# Patient Record
Sex: FEMALE | Race: WHITE | NOT HISPANIC OR LATINO
[De-identification: names, ages, dates, MRNs, and addresses within clinical notes are randomized per-mention and may not be internally consistent; named-entity substitution may affect disease eponyms.]

---

## 2019-01-11 ENCOUNTER — RESULT REVIEW (OUTPATIENT)
Age: 39
End: 2019-01-11

## 2021-08-03 PROBLEM — Z00.00 ENCOUNTER FOR PREVENTIVE HEALTH EXAMINATION: Status: ACTIVE | Noted: 2021-08-03

## 2021-09-14 ENCOUNTER — APPOINTMENT (OUTPATIENT)
Dept: PEDIATRIC ALLERGY IMMUNOLOGY | Facility: CLINIC | Age: 41
End: 2021-09-14
Payer: COMMERCIAL

## 2021-09-14 ENCOUNTER — LABORATORY RESULT (OUTPATIENT)
Age: 41
End: 2021-09-14

## 2021-09-14 VITALS
HEIGHT: 68 IN | BODY MASS INDEX: 25.91 KG/M2 | DIASTOLIC BLOOD PRESSURE: 80 MMHG | OXYGEN SATURATION: 98 % | WEIGHT: 171 LBS | HEART RATE: 76 BPM | SYSTOLIC BLOOD PRESSURE: 122 MMHG | TEMPERATURE: 96.2 F

## 2021-09-14 DIAGNOSIS — Z83.6 FAMILY HISTORY OF OTHER DISEASES OF THE RESPIRATORY SYSTEM: ICD-10-CM

## 2021-09-14 PROCEDURE — 36415 COLL VENOUS BLD VENIPUNCTURE: CPT

## 2021-09-14 PROCEDURE — 99203 OFFICE O/P NEW LOW 30 MIN: CPT | Mod: 25

## 2021-09-14 RX ORDER — AZELASTINE HYDROCHLORIDE 137 UG/1
137 SPRAY, METERED NASAL TWICE DAILY
Qty: 1 | Refills: 5 | Status: ACTIVE | COMMUNITY
Start: 2021-09-14 | End: 1900-01-01

## 2021-09-14 RX ORDER — FLUTICASONE PROPIONATE 50 UG/1
50 SPRAY, METERED NASAL DAILY
Qty: 1 | Refills: 5 | Status: ACTIVE | COMMUNITY
Start: 2021-09-14 | End: 1900-01-01

## 2021-09-14 RX ORDER — KETOTIFEN FUMARATE 0.25 MG/ML
0.03 SOLUTION OPHTHALMIC
Qty: 1 | Refills: 5 | Status: ACTIVE | COMMUNITY
Start: 2021-09-14 | End: 1900-01-01

## 2021-10-01 LAB
A ALTERNATA IGE QN: 9.45 KUA/L
A FUMIGATUS IGE QN: 0.77 KUA/L
AMER BEECH IGE QN: 1
BERMUDA GRASS IGE QN: 0.16 KUA/L
BOXELDER IGE QN: 1.99 KUA/L
C HERBARUM IGE QN: 0.84 KUA/L
CALIF WALNUT IGE QN: 2.09 KUA/L
CAT DANDER IGE QN: 14.8 KUA/L
CMN PIGWEED IGE QN: 0.58 KUA/L
COCKLEBUR IGE QN: 0.45 KUA/L
COCKSFOOT IGE QN: 2.34 KUA/L
COMMON RAGWEED IGE QN: 1.99 KUA/L
COTTONWOOD IGE QN: 0.79 KUA/L
D FARINAE IGE QN: 6.54 KUA/L
D PTERONYSS IGE QN: 6.17 KUA/L
DEPRECATED A ALTERNATA IGE RAST QL: 3
DEPRECATED A FUMIGATUS IGE RAST QL: 2
DEPRECATED AMER BEECH IGE RAST QL: 0.66 KUA/L
DEPRECATED BERMUDA GRASS IGE RAST QL: NORMAL
DEPRECATED BOXELDER IGE RAST QL: 2
DEPRECATED C HERBARUM IGE RAST QL: 2
DEPRECATED CAT DANDER IGE RAST QL: 3
DEPRECATED COCKLEBUR IGE RAST QL: 1
DEPRECATED COCKSFOOT IGE RAST QL: 2
DEPRECATED COMMON PIGWEED IGE RAST QL: 1
DEPRECATED COMMON RAGWEED IGE RAST QL: 2
DEPRECATED COTTONWOOD IGE RAST QL: 2
DEPRECATED D FARINAE IGE RAST QL: 3
DEPRECATED D PTERONYSS IGE RAST QL: 3
DEPRECATED DOG DANDER IGE RAST QL: 3
DEPRECATED ENGL PLANTAIN IGE RAST QL: NORMAL
DEPRECATED GIANT RAGWEED IGE RAST QL: 1
DEPRECATED GOOSE FEATHER IGE RAST QL: 0
DEPRECATED GOOSEFOOT IGE RAST QL: 2
DEPRECATED JOHNSON GRASS IGE RAST QL: 1
DEPRECATED KENT BLUE GRASS IGE RAST QL: 2
DEPRECATED LONDON PLANE IGE RAST QL: 2
DEPRECATED MEADOW FESCUE IGE RAST QL: 2
DEPRECATED MOUSE URINE PROT IGE RAST QL: 0
DEPRECATED MUGWORT IGE RAST QL: 1
DEPRECATED P NOTATUM IGE RAST QL: NORMAL
DEPRECATED RED CEDAR IGE RAST QL: NORMAL
DEPRECATED RED TOP GRASS IGE RAST QL: 2
DEPRECATED ROACH IGE RAST QL: NORMAL
DEPRECATED RYE IGE RAST QL: 2
DEPRECATED SALTWORT IGE RAST QL: 1
DEPRECATED SILVER BIRCH IGE RAST QL: 1
DEPRECATED SW VERNAL GRASS IGE RAST QL: 2
DEPRECATED TIMOTHY IGE RAST QL: 2
DEPRECATED WHITE ASH IGE RAST QL: 2
DEPRECATED WHITE HICKORY IGE RAST QL: 3
DEPRECATED WHITE OAK IGE RAST QL: NORMAL
DOG DANDER IGE QN: 7.81 KUA/L
ENGL PLANTAIN IGE QN: 0.25 KUA/L
GIANT RAGWEED IGE QN: 0.66 KUA/L
GOOSE FEATHER IGE QN: <0.1 KUA/L
GOOSEFOOT IGE QN: 1.34 KUA/L
JOHNSON GRASS IGE QN: 0.66 KUA/L
KENT BLUE GRASS IGE QN: 2.72 KUA/L
LONDON PLANE IGE QN: 1.66 KUA/L
MEADOW FESCUE IGE QN: 3.12 KUA/L
MOUSE URINE PROT IGE QN: <0.1 KUA/L
MUGWORT IGE QN: 0.46 KUA/L
MULBERRY (T70) CLASS: 0
MULBERRY (T70) CONC: <0.1 KUA/L
P NOTATUM IGE QN: 0.26 KUA/L
RED CEDAR IGE QN: 0.2 KUA/L
RED TOP GRASS IGE QN: 2.86 KUA/L
ROACH IGE QN: 0.1 KUA/L
RYE IGE QN: 2.32 KUA/L
SALTWORT IGE QN: 0.58 KUA/L
SILVER BIRCH IGE QN: 0.42 KUA/L
SW VERNAL GRASS IGE QN: 2.64 KUA/L
TIMOTHY IGE QN: 2.54 KUA/L
TREE ALLERG MIX1 IGE QL: 2
WHITE ASH IGE QN: 0.82 KUA/L
WHITE ELM IGE QN: 0.97 KUA/L
WHITE ELM IGE QN: 2
WHITE HICKORY IGE QN: 6.04 KUA/L
WHITE OAK IGE QN: 0.3 KUA/L

## 2021-10-01 NOTE — HISTORY OF PRESENT ILLNESS
[de-identified] : Yany is a 40 year old woman with allergies who presents for initial allergy evaluation. \par \par Tolerates milk, eggs, wheat, soy, peanut, tree nut, fish and shellfish.\par Maybe some lactose intolerance - mild abdominal pain with dairy.\par Tolerates tofu and edamame but has itchy mouth with soy milk with cereal only (can tolerate soy milk in coffee).\par \par Allergic rhinitis: Symptoms include nasal congestion, rhinorrhea, sneezing, post-nasal drip, ocular pruritus, nasal pruritus, watery eyes, snoring, and mouth breathing.\par Symptoms are present all year long.\par Medications include zyrtec, singulair - takes daily. Did not take today but took over the last few days. \par Eyes have been particularly bad - worse now in the fall. \par \par Asthma - albuterol PRN\par Sometimes gets wheezy.\par Animals (cats) trigger wheezing as well as pollen.\par Colds are triggers too.\par Never took OCS.\par Took Advair at one point a few years - was doing well despite not taking it so her PMD took her off of it.\par \par Eczema as a child - no longer. \par \par No other medical problems. \par \par Mother says maybe she has a PCN allergy as a kid -  maybe a rash.

## 2021-10-01 NOTE — SOCIAL HISTORY
[House] : [unfilled] lives in a house  [None] : none [Single] : single [de-identified] : 2 daughters [FreeTextEntry2] : communications - private [employer] [Smokers in Household] : there are no smokers in the home

## 2021-10-01 NOTE — REVIEW OF SYSTEMS
[Eye Redness] : redness [Eye Itching] : itchy eyes [Nl] : Genitourinary [Immunizations are up to date] : Immunizations are up to date [Received Influenza Vaccine this Past Year] : patient has received the Influenza vaccine this past year [FreeTextEntry1] : s/p COVID vaccine

## 2021-10-20 ENCOUNTER — TRANSCRIPTION ENCOUNTER (OUTPATIENT)
Age: 41
End: 2021-10-20

## 2021-10-20 ENCOUNTER — APPOINTMENT (OUTPATIENT)
Dept: PEDIATRIC ALLERGY IMMUNOLOGY | Facility: CLINIC | Age: 41
End: 2021-10-20
Payer: COMMERCIAL

## 2021-10-20 VITALS
TEMPERATURE: 96.3 F | OXYGEN SATURATION: 98 % | SYSTOLIC BLOOD PRESSURE: 128 MMHG | DIASTOLIC BLOOD PRESSURE: 87 MMHG | HEART RATE: 80 BPM | BODY MASS INDEX: 25.76 KG/M2 | HEIGHT: 68 IN | WEIGHT: 170 LBS

## 2021-10-20 DIAGNOSIS — J30.9 ALLERGIC RHINITIS, UNSPECIFIED: ICD-10-CM

## 2021-10-20 DIAGNOSIS — L50.8 OTHER URTICARIA: ICD-10-CM

## 2021-10-20 DIAGNOSIS — Z91.013 ALLERGY TO SEAFOOD: ICD-10-CM

## 2021-10-20 DIAGNOSIS — H10.10 ACUTE ATOPIC CONJUNCTIVITIS, UNSPECIFIED EYE: ICD-10-CM

## 2021-10-20 PROCEDURE — 95004 PERQ TESTS W/ALRGNC XTRCS: CPT

## 2021-10-20 PROCEDURE — 99213 OFFICE O/P EST LOW 20 MIN: CPT | Mod: 25

## 2021-10-20 PROCEDURE — 95024 IQ TESTS W/ALLERGENIC XTRCS: CPT

## 2021-10-20 RX ORDER — EPINEPHRINE 0.3 MG/.3ML
0.3 INJECTION INTRAMUSCULAR
Qty: 1 | Refills: 0 | Status: ACTIVE | COMMUNITY
Start: 2021-10-20 | End: 1900-01-01

## 2021-10-20 NOTE — HISTORY OF PRESENT ILLNESS
[de-identified] : Yany is a 40 year old woman with allergies who presents for initial allergy evaluation. \par \par She presents for intradermal testing. She has been off Allegra for at least 5 days.  Has been very itchy. Had some hives yesterday. May have taken some antihistamine eye drops yesterday.\par When she was a teenager she was eating pasta that was made with crab (ate pasta and sauce only) and within 30 minutes she had profuse emesis. Can't recall any other symptoms. Since that time she may have eaten shrimp once and lobster once and had no reaction since that reaction but it was just a bite. Has tolerated oysters and maybe clams.\par Curious about sensitivity to soy, egg and milk. \par \par Sept 14th:\par Tolerates milk, eggs, wheat, soy, peanut, tree nut, fish and shellfish.\par Maybe some lactose intolerance - mild abdominal pain with dairy.\par Tolerates tofu and edamame but has itchy mouth with soy milk with cereal only (can tolerate soy milk in coffee).\par \par Allergic rhinitis: Symptoms include nasal congestion, rhinorrhea, sneezing, post-nasal drip, ocular pruritus, nasal pruritus, watery eyes, snoring, and mouth breathing.\par Symptoms are present all year long.\par Medications include zyrtec, singulair - takes daily. Did not take today but took over the last few days. \par Eyes have been particularly bad - worse now in the fall. \par \par Asthma - albuterol PRN\par Sometimes gets wheezy.\par Animals (cats) trigger wheezing as well as pollen.\par Colds are triggers too.\par Never took OCS.\par Took Advair at one point a few years - was doing well despite not taking it so her PMD took her off of it.\par \par Eczema as a child - no longer. \par \par No other medical problems. \par \par Mother says maybe she has a PCN allergy as a kid -  maybe a rash.

## 2021-10-20 NOTE — SOCIAL HISTORY
[House] : [unfilled] lives in a house  [None] : none [Single] : single [de-identified] : 2 daughters [FreeTextEntry2] : communications - private [employer] [Smokers in Household] : there are no smokers in the home

## 2021-10-20 NOTE — PHYSICAL EXAM

## 2022-01-20 DIAGNOSIS — R76.8 OTHER SPECIFIED ABNORMAL IMMUNOLOGICAL FINDINGS IN SERUM: ICD-10-CM

## 2022-01-27 ENCOUNTER — LABORATORY RESULT (OUTPATIENT)
Age: 42
End: 2022-01-27

## 2022-01-27 LAB
ALBUMIN SERPL ELPH-MCNC: 4.5 G/DL
ALP BLD-CCNC: 47 U/L
ALT SERPL-CCNC: 12 U/L
ANION GAP SERPL CALC-SCNC: 13 MMOL/L
APPEARANCE: CLEAR
AST SERPL-CCNC: 19 U/L
BASOPHILS # BLD AUTO: 0.08 K/UL
BASOPHILS NFR BLD AUTO: 1 %
BILIRUB SERPL-MCNC: 0.7 MG/DL
BILIRUBIN URINE: NEGATIVE
BLOOD URINE: NORMAL
BUN SERPL-MCNC: 7 MG/DL
CALCIUM SERPL-MCNC: 9.2 MG/DL
CHLORIDE SERPL-SCNC: 105 MMOL/L
CK SERPL-CCNC: 86 U/L
CO2 SERPL-SCNC: 22 MMOL/L
COLOR: YELLOW
CREAT SERPL-MCNC: 0.7 MG/DL
CRP SERPL-MCNC: <3 MG/L
EOSINOPHIL # BLD AUTO: 0.18 K/UL
EOSINOPHIL NFR BLD AUTO: 2.4 %
ERYTHROCYTE [SEDIMENTATION RATE] IN BLOOD BY WESTERGREN METHOD: 2 MM/HR
GLUCOSE QUALITATIVE U: NEGATIVE
GLUCOSE SERPL-MCNC: 93 MG/DL
HCT VFR BLD CALC: 40.4 %
HGB BLD-MCNC: 13.3 G/DL
IMM GRANULOCYTES NFR BLD AUTO: 0.3 %
KETONES URINE: ABNORMAL
LEUKOCYTE ESTERASE URINE: NEGATIVE
LYMPHOCYTES # BLD AUTO: 1.29 K/UL
LYMPHOCYTES NFR BLD AUTO: 16.9 %
MAN DIFF?: NORMAL
MCHC RBC-ENTMCNC: 30.9 PG
MCHC RBC-ENTMCNC: 32.9 GM/DL
MCV RBC AUTO: 93.7 FL
MONOCYTES # BLD AUTO: 0.41 K/UL
MONOCYTES NFR BLD AUTO: 5.4 %
NEUTROPHILS # BLD AUTO: 5.65 K/UL
NEUTROPHILS NFR BLD AUTO: 74 %
NITRITE URINE: NEGATIVE
PH URINE: 5.5
PLATELET # BLD AUTO: 328 K/UL
POTASSIUM SERPL-SCNC: 4.2 MMOL/L
PROT SERPL-MCNC: 6.6 G/DL
PROTEIN URINE: NEGATIVE
RBC # BLD: 4.31 M/UL
RBC # FLD: 12.4 %
RHEUMATOID FACT SER QL: <10 IU/ML
SODIUM SERPL-SCNC: 140 MMOL/L
SPECIFIC GRAVITY URINE: 1.02
TSH SERPL-ACNC: 1.74 UIU/ML
UROBILINOGEN URINE: NORMAL
WBC # FLD AUTO: 7.63 K/UL

## 2022-01-31 LAB
C3 SERPL-MCNC: 92 MG/DL
C4 SERPL-MCNC: 26 MG/DL
CENTROMERE IGG SER-ACNC: <0.2 CD:130001892
CREAT SPEC-SCNC: 191 MG/DL
CREAT/PROT UR: 0 RATIO
ENA JO1 AB SER IA-ACNC: <0.2 AL
ENA RNP AB SER IA-ACNC: 1.8 AL
ENA SCL70 IGG SER IA-ACNC: 0.5 AL
ENA SM AB SER IA-ACNC: <0.2 AL
ENA SS-A AB SER IA-ACNC: 0.2 AL
ENA SS-B AB SER IA-ACNC: <0.2 AL
PROT UR-MCNC: 8 MG/DL
THYROGLOB AB SERPL-ACNC: <20 IU/ML
THYROPEROXIDASE AB SERPL IA-ACNC: 13.6 IU/ML

## 2022-02-03 LAB
ANA PAT FLD IF-IMP: NORMAL
ANA SER IF-ACNC: ABNORMAL
CCP AB SER IA-ACNC: <8 UNITS
DSDNA AB SER-ACNC: <12 IU/ML
RF+CCP IGG SER-IMP: NEGATIVE

## 2022-04-24 ENCOUNTER — RX RENEWAL (OUTPATIENT)
Age: 42
End: 2022-04-24

## 2022-04-24 RX ORDER — AZELASTINE HYDROCHLORIDE 137 UG/1
0.1 SPRAY, METERED NASAL
Qty: 1 | Refills: 5 | Status: ACTIVE | COMMUNITY
Start: 2022-04-24 | End: 1900-01-01

## 2023-06-12 ENCOUNTER — EMERGENCY (EMERGENCY)
Facility: HOSPITAL | Age: 43
LOS: 1 days | Discharge: HOME | End: 2023-06-12
Attending: EMERGENCY MEDICINE | Admitting: EMERGENCY MEDICINE
Payer: COMMERCIAL

## 2023-06-12 VITALS
DIASTOLIC BLOOD PRESSURE: 65 MMHG | HEART RATE: 78 BPM | RESPIRATION RATE: 18 BRPM | TEMPERATURE: 98 F | SYSTOLIC BLOOD PRESSURE: 126 MMHG | OXYGEN SATURATION: 100 %

## 2023-06-12 VITALS
TEMPERATURE: 98 F | DIASTOLIC BLOOD PRESSURE: 77 MMHG | SYSTOLIC BLOOD PRESSURE: 137 MMHG | OXYGEN SATURATION: 99 % | RESPIRATION RATE: 16 BRPM | HEART RATE: 82 BPM

## 2023-06-12 LAB
ALBUMIN SERPL ELPH-MCNC: 4.4 G/DL — SIGNIFICANT CHANGE UP (ref 3.3–5)
ALP SERPL-CCNC: 64 U/L — SIGNIFICANT CHANGE UP (ref 40–120)
ALT FLD-CCNC: 18 U/L — SIGNIFICANT CHANGE UP (ref 4–33)
ANION GAP SERPL CALC-SCNC: 11 MMOL/L — SIGNIFICANT CHANGE UP (ref 7–14)
APTT BLD: 26.6 SEC — LOW (ref 27–36.3)
AST SERPL-CCNC: 26 U/L — SIGNIFICANT CHANGE UP (ref 4–32)
B PERT DNA SPEC QL NAA+PROBE: SIGNIFICANT CHANGE UP
B PERT+PARAPERT DNA PNL SPEC NAA+PROBE: SIGNIFICANT CHANGE UP
BASE EXCESS BLDV CALC-SCNC: 2 MMOL/L — SIGNIFICANT CHANGE UP (ref -2–3)
BASOPHILS # BLD AUTO: 0.12 K/UL — SIGNIFICANT CHANGE UP (ref 0–0.2)
BASOPHILS NFR BLD AUTO: 0.8 % — SIGNIFICANT CHANGE UP (ref 0–2)
BILIRUB SERPL-MCNC: 0.4 MG/DL — SIGNIFICANT CHANGE UP (ref 0.2–1.2)
BLD GP AB SCN SERPL QL: NEGATIVE — SIGNIFICANT CHANGE UP
BLOOD GAS VENOUS COMPREHENSIVE RESULT: SIGNIFICANT CHANGE UP
BORDETELLA PARAPERTUSSIS (RAPRVP): SIGNIFICANT CHANGE UP
BUN SERPL-MCNC: 12 MG/DL — SIGNIFICANT CHANGE UP (ref 7–23)
C PNEUM DNA SPEC QL NAA+PROBE: SIGNIFICANT CHANGE UP
CALCIUM SERPL-MCNC: 9.3 MG/DL — SIGNIFICANT CHANGE UP (ref 8.4–10.5)
CHLORIDE BLDV-SCNC: 104 MMOL/L — SIGNIFICANT CHANGE UP (ref 96–108)
CHLORIDE SERPL-SCNC: 103 MMOL/L — SIGNIFICANT CHANGE UP (ref 98–107)
CO2 BLDV-SCNC: 28.7 MMOL/L — HIGH (ref 22–26)
CO2 SERPL-SCNC: 24 MMOL/L — SIGNIFICANT CHANGE UP (ref 22–31)
CREAT SERPL-MCNC: 0.77 MG/DL — SIGNIFICANT CHANGE UP (ref 0.5–1.3)
EGFR: 98 ML/MIN/1.73M2 — SIGNIFICANT CHANGE UP
EOSINOPHIL # BLD AUTO: 0.23 K/UL — SIGNIFICANT CHANGE UP (ref 0–0.5)
EOSINOPHIL NFR BLD AUTO: 1.5 % — SIGNIFICANT CHANGE UP (ref 0–6)
FLUAV SUBTYP SPEC NAA+PROBE: SIGNIFICANT CHANGE UP
FLUBV RNA SPEC QL NAA+PROBE: SIGNIFICANT CHANGE UP
GAS PNL BLDV: 137 MMOL/L — SIGNIFICANT CHANGE UP (ref 136–145)
GLUCOSE BLDV-MCNC: 105 MG/DL — HIGH (ref 70–99)
GLUCOSE SERPL-MCNC: 104 MG/DL — HIGH (ref 70–99)
HADV DNA SPEC QL NAA+PROBE: SIGNIFICANT CHANGE UP
HCG SERPL-ACNC: <1 MIU/ML — SIGNIFICANT CHANGE UP
HCO3 BLDV-SCNC: 27 MMOL/L — SIGNIFICANT CHANGE UP (ref 22–29)
HCOV 229E RNA SPEC QL NAA+PROBE: SIGNIFICANT CHANGE UP
HCOV HKU1 RNA SPEC QL NAA+PROBE: SIGNIFICANT CHANGE UP
HCOV NL63 RNA SPEC QL NAA+PROBE: SIGNIFICANT CHANGE UP
HCOV OC43 RNA SPEC QL NAA+PROBE: SIGNIFICANT CHANGE UP
HCT VFR BLD CALC: 37.7 % — SIGNIFICANT CHANGE UP (ref 34.5–45)
HCT VFR BLDA CALC: 39 % — SIGNIFICANT CHANGE UP (ref 34.5–46.5)
HGB BLD CALC-MCNC: 13.1 G/DL — SIGNIFICANT CHANGE UP (ref 11.7–16.1)
HGB BLD-MCNC: 12.9 G/DL — SIGNIFICANT CHANGE UP (ref 11.5–15.5)
HMPV RNA SPEC QL NAA+PROBE: SIGNIFICANT CHANGE UP
HPIV1 RNA SPEC QL NAA+PROBE: SIGNIFICANT CHANGE UP
HPIV2 RNA SPEC QL NAA+PROBE: SIGNIFICANT CHANGE UP
HPIV3 RNA SPEC QL NAA+PROBE: SIGNIFICANT CHANGE UP
HPIV4 RNA SPEC QL NAA+PROBE: SIGNIFICANT CHANGE UP
IANC: 11.94 K/UL — HIGH (ref 1.8–7.4)
IMM GRANULOCYTES NFR BLD AUTO: 0.3 % — SIGNIFICANT CHANGE UP (ref 0–0.9)
INR BLD: 1.04 RATIO — SIGNIFICANT CHANGE UP (ref 0.88–1.16)
LACTATE BLDV-MCNC: 1 MMOL/L — SIGNIFICANT CHANGE UP (ref 0.5–2)
LYMPHOCYTES # BLD AUTO: 1.9 K/UL — SIGNIFICANT CHANGE UP (ref 1–3.3)
LYMPHOCYTES # BLD AUTO: 12.6 % — LOW (ref 13–44)
M PNEUMO DNA SPEC QL NAA+PROBE: SIGNIFICANT CHANGE UP
MAGNESIUM SERPL-MCNC: 1.9 MG/DL — SIGNIFICANT CHANGE UP (ref 1.6–2.6)
MCHC RBC-ENTMCNC: 30.8 PG — SIGNIFICANT CHANGE UP (ref 27–34)
MCHC RBC-ENTMCNC: 34.2 GM/DL — SIGNIFICANT CHANGE UP (ref 32–36)
MCV RBC AUTO: 90 FL — SIGNIFICANT CHANGE UP (ref 80–100)
MONOCYTES # BLD AUTO: 0.88 K/UL — SIGNIFICANT CHANGE UP (ref 0–0.9)
MONOCYTES NFR BLD AUTO: 5.8 % — SIGNIFICANT CHANGE UP (ref 2–14)
NEUTROPHILS # BLD AUTO: 11.94 K/UL — HIGH (ref 1.8–7.4)
NEUTROPHILS NFR BLD AUTO: 79 % — HIGH (ref 43–77)
NRBC # BLD: 0 /100 WBCS — SIGNIFICANT CHANGE UP (ref 0–0)
NRBC # FLD: 0 K/UL — SIGNIFICANT CHANGE UP (ref 0–0)
PCO2 BLDV: 44 MMHG — SIGNIFICANT CHANGE UP (ref 39–52)
PH BLDV: 7.4 — SIGNIFICANT CHANGE UP (ref 7.32–7.43)
PLATELET # BLD AUTO: 342 K/UL — SIGNIFICANT CHANGE UP (ref 150–400)
PO2 BLDV: 43 MMHG — SIGNIFICANT CHANGE UP (ref 25–45)
POTASSIUM BLDV-SCNC: 3.8 MMOL/L — SIGNIFICANT CHANGE UP (ref 3.5–5.1)
POTASSIUM SERPL-MCNC: 3.7 MMOL/L — SIGNIFICANT CHANGE UP (ref 3.5–5.3)
POTASSIUM SERPL-SCNC: 3.7 MMOL/L — SIGNIFICANT CHANGE UP (ref 3.5–5.3)
PROT SERPL-MCNC: 6.8 G/DL — SIGNIFICANT CHANGE UP (ref 6–8.3)
PROTHROM AB SERPL-ACNC: 12.1 SEC — SIGNIFICANT CHANGE UP (ref 10.5–13.4)
RAPID RVP RESULT: SIGNIFICANT CHANGE UP
RBC # BLD: 4.19 M/UL — SIGNIFICANT CHANGE UP (ref 3.8–5.2)
RBC # FLD: 12.3 % — SIGNIFICANT CHANGE UP (ref 10.3–14.5)
RH IG SCN BLD-IMP: NEGATIVE — SIGNIFICANT CHANGE UP
RSV RNA SPEC QL NAA+PROBE: SIGNIFICANT CHANGE UP
RV+EV RNA SPEC QL NAA+PROBE: SIGNIFICANT CHANGE UP
SAO2 % BLDV: 55.5 % — LOW (ref 67–88)
SARS-COV-2 RNA SPEC QL NAA+PROBE: SIGNIFICANT CHANGE UP
SODIUM SERPL-SCNC: 138 MMOL/L — SIGNIFICANT CHANGE UP (ref 135–145)
TROPONIN T, HIGH SENSITIVITY RESULT: <6 NG/L — SIGNIFICANT CHANGE UP
WBC # BLD: 15.12 K/UL — HIGH (ref 3.8–10.5)
WBC # FLD AUTO: 15.12 K/UL — HIGH (ref 3.8–10.5)

## 2023-06-12 PROCEDURE — 70450 CT HEAD/BRAIN W/O DYE: CPT | Mod: 26,QQ

## 2023-06-12 PROCEDURE — 73120 X-RAY EXAM OF HAND: CPT | Mod: 26,RT

## 2023-06-12 PROCEDURE — 71046 X-RAY EXAM CHEST 2 VIEWS: CPT | Mod: 26

## 2023-06-12 PROCEDURE — 72125 CT NECK SPINE W/O DYE: CPT | Mod: 26,QQ

## 2023-06-12 PROCEDURE — 73552 X-RAY EXAM OF FEMUR 2/>: CPT | Mod: 26,LT

## 2023-06-12 PROCEDURE — 73590 X-RAY EXAM OF LOWER LEG: CPT | Mod: 26,LT

## 2023-06-12 PROCEDURE — 93010 ELECTROCARDIOGRAM REPORT: CPT

## 2023-06-12 PROCEDURE — 99285 EMERGENCY DEPT VISIT HI MDM: CPT | Mod: 25

## 2023-06-12 PROCEDURE — 70486 CT MAXILLOFACIAL W/O DYE: CPT | Mod: 26,QQ

## 2023-06-12 PROCEDURE — 73562 X-RAY EXAM OF KNEE 3: CPT | Mod: 26,LT

## 2023-06-12 PROCEDURE — 12053 INTMD RPR FACE/MM 5.1-7.5 CM: CPT

## 2023-06-12 PROCEDURE — 73110 X-RAY EXAM OF WRIST: CPT | Mod: 26,RT

## 2023-06-12 PROCEDURE — 93010 ELECTROCARDIOGRAM REPORT: CPT | Mod: 77

## 2023-06-12 RX ORDER — MORPHINE SULFATE 50 MG/1
4 CAPSULE, EXTENDED RELEASE ORAL
Qty: 24 | Refills: 0
Start: 2023-06-12 | End: 2023-06-14

## 2023-06-12 RX ORDER — ACETAMINOPHEN 500 MG
30 TABLET ORAL
Qty: 900 | Refills: 0
Start: 2023-06-12 | End: 2023-06-21

## 2023-06-12 RX ORDER — MORPHINE SULFATE 50 MG/1
2 CAPSULE, EXTENDED RELEASE ORAL ONCE
Refills: 0 | Status: DISCONTINUED | OUTPATIENT
Start: 2023-06-12 | End: 2023-06-12

## 2023-06-12 RX ORDER — SODIUM CHLORIDE 9 MG/ML
1000 INJECTION INTRAMUSCULAR; INTRAVENOUS; SUBCUTANEOUS ONCE
Refills: 0 | Status: COMPLETED | OUTPATIENT
Start: 2023-06-12 | End: 2023-06-12

## 2023-06-12 RX ORDER — IBUPROFEN 200 MG
30 TABLET ORAL
Qty: 900 | Refills: 0
Start: 2023-06-12 | End: 2023-06-21

## 2023-06-12 RX ORDER — ACETAMINOPHEN 500 MG
975 TABLET ORAL ONCE
Refills: 0 | Status: COMPLETED | OUTPATIENT
Start: 2023-06-12 | End: 2023-06-12

## 2023-06-12 RX ORDER — TETANUS TOXOID, REDUCED DIPHTHERIA TOXOID AND ACELLULAR PERTUSSIS VACCINE, ADSORBED 5; 2.5; 8; 8; 2.5 [IU]/.5ML; [IU]/.5ML; UG/.5ML; UG/.5ML; UG/.5ML
0.5 SUSPENSION INTRAMUSCULAR ONCE
Refills: 0 | Status: COMPLETED | OUTPATIENT
Start: 2023-06-12 | End: 2023-06-12

## 2023-06-12 RX ADMIN — TETANUS TOXOID, REDUCED DIPHTHERIA TOXOID AND ACELLULAR PERTUSSIS VACCINE, ADSORBED 0.5 MILLILITER(S): 5; 2.5; 8; 8; 2.5 SUSPENSION INTRAMUSCULAR at 02:52

## 2023-06-12 RX ADMIN — Medication 975 MILLIGRAM(S): at 04:47

## 2023-06-12 RX ADMIN — MORPHINE SULFATE 2 MILLIGRAM(S): 50 CAPSULE, EXTENDED RELEASE ORAL at 16:25

## 2023-06-12 RX ADMIN — MORPHINE SULFATE 2 MILLIGRAM(S): 50 CAPSULE, EXTENDED RELEASE ORAL at 15:57

## 2023-06-12 RX ADMIN — Medication 975 MILLIGRAM(S): at 05:17

## 2023-06-12 RX ADMIN — MORPHINE SULFATE 2 MILLIGRAM(S): 50 CAPSULE, EXTENDED RELEASE ORAL at 17:37

## 2023-06-12 RX ADMIN — SODIUM CHLORIDE 1000 MILLILITER(S): 9 INJECTION INTRAMUSCULAR; INTRAVENOUS; SUBCUTANEOUS at 02:47

## 2023-06-12 NOTE — CHART NOTE - NSCHARTNOTEFT_GEN_A_CORE
[facility] Procedure note:    patient presents to [facility] for closed reduction of left subcondylar fracture    prior to the start of the procedure the patient was consented with the risks, benefits, and alternatives of the procedure. all of the patient's questions wwre answered to the patient's satisfaction. the patient elects to move forward with the procedure today.     prior to the start of the procedure a universal timeout was performed    4.0 cc of 2% lidocaine with 1:100,000 epi for infiltration around maxillary and mandibular areas  3.4 cc of 2% lidocaine with 1:100,000 epi for bilateral IANB    4x 7mm IMF screws placed with panoramic guidance     patient was closed reduced with heavy elastics reduction.     patient was escorted back to ED after procedure    Plan:  - patient cleared for d/c from [facility] perspective  - d/c on LIQUID medications  - d/c on liquid augmentin 875 mg BID x 7 days   - patient has 1 week follow up with [facility]  - full liquid diet    Armani Baer DDS  [facility] pager: [phone]   [place]: [phone]  [employer]

## 2023-06-12 NOTE — CONSULT NOTE ADULT - SUBJECTIVE AND OBJECTIVE BOX
OMFS CONSULT NOTE - BATSHEVA FLETCHER     HPI: 42F PMH sig anxiety/depression p/w facial trauma s/p unwitnessed fall at home in setting of syncope 2/2 acute episode of diarrhea. Patient reports +LOC w/ chin strike. Patient endorsed dizziness and lightheadedness and fell while getting out of bed last night. Patient complains of subjective changes to her occlusion, denies lip numbness, no dysphagia, no difficulty breathing, speaking in full sentences, adequately tolerating secretions. Patient denies n/v/f/c, cp, palpitations. Patient was found to have left mandibular fracture on CT imaging. OMFS was consulted for management of facial fractures. Open chin lac to be repaired by ED. Currently afebrile, HD stable.    PMH: Asthma, depression, anxiety  Meds: Lexapro  Allergies: PCN  SH; Denies x3    ROS: Negative except per HPI    MEDICATIONS  (STANDING):  ---    MEDICATIONS  (PRN):  ---    Vital Signs Last 24 Hrs  T(C): 36.7 (12 Jun 2023 04:50), Max: 36.8 (12 Jun 2023 00:50)  T(F): 98 (12 Jun 2023 04:50), Max: 98.2 (12 Jun 2023 00:50)  HR: 87 (12 Jun 2023 04:50) (78 - 87)  BP: 136/76 (12 Jun 2023 04:50) (126/65 - 136/76)  BP(mean): --  RR: 18 (12 Jun 2023 04:50) (18 - 18)  SpO2: 98% (12 Jun 2023 04:50) (98% - 100%)    Parameters below as of 12 Jun 2023 04:50  Patient On (Oxygen Delivery Method): room air      I&O's Detail  ---    PHYSICAL EXAM  Gen: NAD, resting comfortably in bed  CV: HD stable, regular rate  Pulm: Normal WOB on RA, no respiratory distress  Abd: soft, NT/ND  Ext: WWP, VERDUGO  OMFS: No facial swelling, (+)TTP over left subcondylar region, (+) open linear chin lacerations, V3 sensation intact to light touch, malocclusion w/ right-sided cross bite, able to manipulate back into MIP. Dentition grossly intact, no dental fractures, no mandibular step defect, no maxillary mobility, no gingival lacerations, no FOM ecchymosis.      LABS:                        12.9   15.12 )-----------( 342      ( 12 Jun 2023 02:42 )             37.7     06-12    138  |  103  |  12  ----------------------------<  104<H>  3.7   |  24  |  0.77    Ca    9.3      12 Jun 2023 02:42  Mg     1.90     06-12    TPro  6.8  /  Alb  4.4  /  TBili  0.4  /  DBili  x   /  AST  26  /  ALT  18  /  AlkPhos  64  06-12      CAPILLARY BLOOD GLUCOSE  ---    Radiology and Additional Studies:  ACC: [id number] EXAM:  CT CERVICAL SPINE   ORDERED BY: KENNETH HERNANDEZ     ACC: [id number] EXAM:  CT MAXILLOFACIAL   ORDERED BY: KENNETH HERNANDEZ     ACC: [id number] EXAM:  CT BRAIN   ORDERED BY: KENNETH HERNANDEZ     PROCEDURE DATE:  06/12/2023          INTERPRETATION:  HISTORY: Trauma.    COMPARISON: None    TECHNIQUE: Axial noncontrast CT images of the head, cervical spine, and   face were obtained and submitted for interpretation. Sagittal and coronal   reformatted images were provided. Bone and soft tissue windows were   evaluated.    FINDINGS:    CT BRAIN:    No acute intracranial hemorrhage or suspicious extra-axial fluid   collection. No focal edema or acute mass effect. No hydrocephalus.   Basilar cisterns are clear. Parenchyma volume and density isnormal.    The scalp and imaged mid facial soft tissues are unremarkable. Imaged   paranasal sinuses and mastoid air cells are clear. Pneumatization of the   petrous apex bilaterally.    CT CERVICAL SPINE:    Straightening of the cervical lordosis due to muscle spasm and/or   positioning.    There is no prevertebral soft tissue swelling. Vertebral body heights and   alignment are maintained without compression deformity or subluxation.    The atlantodental and atlanto-occipital joints are maintained. Articular   facets and posterior elements alignment is maintained.    Disc osteophyte complex at C5-6 narrows the central canal to 8 mm and   results in mild right foraminal stenosis.    The partially imaged lung apices are clear.    CT FACE    Acute, nondisplaced fracture of the left mandibular ramus. Remainder of   the facial osseous structures are intact. Temporomandibular joints are   normally located.    Mild paranasal sinus mucosal thickening. Mastoid air cells are clear.    No retrobulbar hematoma. The nasopharyngeal contours are unremarkable.    IMPRESSION:    CT BRAIN: No acute intracranial CT abnormality.    CT CERVICAL SPINE: No acute fracture or traumatic malalignment.    CT FACE: Nondisplaced fracture of the left mandibular ramus.    --- End of Report ---    DOROTHEA WATTERS MD; Resident Radiologist  This document has been electronically signed.  ALETHEA HURD MD; Attending Radiologist  This document has been electronically signed. Jun 12 2023  5:29AM      A/P: 42F p/w facial trauma w/ LOC s/p unwitnessed syncopal fall 2/2 diarrhea, found to have non-displaced L. subcondylar fracture on CT imaging  - OMFS plan for open vs. closed treatment of L. mandibular fracture  - NPO for possible OR procedure under GETA  - Rec medicine admission for syncopal work-up and perioperative medical management  - IV Clindamycin  - Multimodal analgesia  - Case d/w attending on-call    Moo Jo DDS, MD  OMFS Resident PGY-5  [facility] Pager [phone]  [facility] [phone]  Available on [employer]    [facility]  [facility]   Address: [phone] [place] [address]  Phone: [phone] OMFS CONSULT NOTE - BATSHEVA FLETCHER     HPI: 42F PMH sig anxiety/depression p/w facial trauma s/p unwitnessed fall at home in setting of syncope 2/2 acute episode of diarrhea. Patient reports +LOC w/ chin strike. Patient endorsed dizziness and lightheadedness and fell while getting out of bed last night. Patient complains of subjective changes to her occlusion, denies lip numbness, no dysphagia, no difficulty breathing, speaking in full sentences, adequately tolerating secretions. Patient denies n/v/f/c, cp, palpitations. Patient was found to have left mandibular fracture on CT imaging. OMFS was consulted for management of facial fractures. Open chin lac to be repaired by ED. Currently afebrile, HD stable.    PMH: Asthma, depression, anxiety  Meds: Lexapro  Allergies: PCN  SH; Denies x3    ROS: Negative except per HPI    MEDICATIONS  (STANDING):  ---    MEDICATIONS  (PRN):  ---    Vital Signs Last 24 Hrs  T(C): 36.7 (12 Jun 2023 04:50), Max: 36.8 (12 Jun 2023 00:50)  T(F): 98 (12 Jun 2023 04:50), Max: 98.2 (12 Jun 2023 00:50)  HR: 87 (12 Jun 2023 04:50) (78 - 87)  BP: 136/76 (12 Jun 2023 04:50) (126/65 - 136/76)  BP(mean): --  RR: 18 (12 Jun 2023 04:50) (18 - 18)  SpO2: 98% (12 Jun 2023 04:50) (98% - 100%)    Parameters below as of 12 Jun 2023 04:50  Patient On (Oxygen Delivery Method): room air      I&O's Detail  ---    PHYSICAL EXAM  Gen: NAD, resting comfortably in bed  CV: HD stable, regular rate  Pulm: Normal WOB on RA, no respiratory distress  Abd: soft, NT/ND  Ext: WWP, VERDUGO  OMFS: No facial swelling, (+)TTP over left subcondylar region, (+) open linear chin lacerations, V3 sensation intact to light touch, malocclusion w/ right-sided cross bite, able to manipulate back into MIP. Dentition grossly intact, no dental fractures, no mandibular step defect, no maxillary mobility, no gingival lacerations, no FOM ecchymosis.      LABS:                        12.9   15.12 )-----------( 342      ( 12 Jun 2023 02:42 )             37.7     06-12    138  |  103  |  12  ----------------------------<  104<H>  3.7   |  24  |  0.77    Ca    9.3      12 Jun 2023 02:42  Mg     1.90     06-12    TPro  6.8  /  Alb  4.4  /  TBili  0.4  /  DBili  x   /  AST  26  /  ALT  18  /  AlkPhos  64  06-12      CAPILLARY BLOOD GLUCOSE  ---    Radiology and Additional Studies:  ACC: [id number] EXAM:  CT CERVICAL SPINE   ORDERED BY: KENNETH HERNANDEZ     ACC: [id number] EXAM:  CT MAXILLOFACIAL   ORDERED BY: KENNETH HERNANDEZ     ACC: [id number] EXAM:  CT BRAIN   ORDERED BY: KENNETH HERNANDEZ     PROCEDURE DATE:  06/12/2023          INTERPRETATION:  HISTORY: Trauma.    COMPARISON: None    TECHNIQUE: Axial noncontrast CT images of the head, cervical spine, and   face were obtained and submitted for interpretation. Sagittal and coronal   reformatted images were provided. Bone and soft tissue windows were   evaluated.    FINDINGS:    CT BRAIN:    No acute intracranial hemorrhage or suspicious extra-axial fluid   collection. No focal edema or acute mass effect. No hydrocephalus.   Basilar cisterns are clear. Parenchyma volume and density isnormal.    The scalp and imaged mid facial soft tissues are unremarkable. Imaged   paranasal sinuses and mastoid air cells are clear. Pneumatization of the   petrous apex bilaterally.    CT CERVICAL SPINE:    Straightening of the cervical lordosis due to muscle spasm and/or   positioning.    There is no prevertebral soft tissue swelling. Vertebral body heights and   alignment are maintained without compression deformity or subluxation.    The atlantodental and atlanto-occipital joints are maintained. Articular   facets and posterior elements alignment is maintained.    Disc osteophyte complex at C5-6 narrows the central canal to 8 mm and   results in mild right foraminal stenosis.    The partially imaged lung apices are clear.    CT FACE    Acute, nondisplaced fracture of the left mandibular ramus. Remainder of   the facial osseous structures are intact. Temporomandibular joints are   normally located.    Mild paranasal sinus mucosal thickening. Mastoid air cells are clear.    No retrobulbar hematoma. The nasopharyngeal contours are unremarkable.    IMPRESSION:    CT BRAIN: No acute intracranial CT abnormality.    CT CERVICAL SPINE: No acute fracture or traumatic malalignment.    CT FACE: Nondisplaced fracture of the left mandibular ramus.    --- End of Report ---    DOROTHEA WATTERS MD; Resident Radiologist  This document has been electronically signed.  ALETHEA HURD MD; Attending Radiologist  This document has been electronically signed. Jun 12 2023  5:29AM

## 2023-06-12 NOTE — ED PROVIDER NOTE - PATIENT PORTAL LINK FT
You can access the FollowMyHealth Patient Portal offered by [facility] by registering at the following website: [email or web address]. By joining [employer]’s FollowMyHealth portal, you will also be able to view your health information using other applications (apps) compatible with our system.

## 2023-06-12 NOTE — ED ADULT NURSE NOTE - OBJECTIVE STATEMENT
A&Ox4. Past medical history or asthma and on Lexapro. Presents with syncopal episode at home and DEEP lac to chin, no active bleeding- no blood thinners. Patient states she was having diarrhea and stood and syncopized. A&Ox4. Past medical history or asthma and on Lexapro. Presents with syncopal episode at home and DEEP lac to chin, no active bleeding- no blood thinners. Patient states she was having diarrhea, stood up and synopsizes. Patient admits to LOC and not having enough fluid intake throughout the day. Denies fever, chills or SOB. Respirations even and unlabored. 20 gauge IV started in left AC. Medications given as per current care plan. Safety precautions in place, bed in lowest position and oriented to call bell.

## 2023-06-12 NOTE — ED PROVIDER NOTE - OBJECTIVE STATEMENT
The patient is a 42y Female with pmhx of depression/anxiety p/w syncopal episode and chin laceration. Said that she went out last night, had a few drinks of alcohol, came home and ordered take out Greek food. Pt also admits that she didn't drink a lot of fluids throughout the day. Said that after she ate food, developed diffuse abd pain and had multiple episodes of watery diarrhea. Said that after she had diarrhea BMs, she felt lightheaded and passed out falling face first hitting her chin against the ground. Lost consciousness for a few seconds before waking up. Endorses suffering laceration to chin with bleeding. Called her brother who brought pt to ED. Denies CP or SOB prior to or after syncopal episode. No hx of cardiac disease. Doesn't remember last tetanus shot. Says she feels much better now, but has chin laceration, R hand pain, and L knee pain. Denies fever, cp, sob, abd pain, n/v, leg swelling, urinary symptoms. NKDA.

## 2023-06-12 NOTE — ED PROVIDER NOTE - ATTENDING CONTRIBUTION TO CARE
42-year-old female past medical history of anxiety and depression presenting after a syncopal episode.  After going out last night and drinking some alcohol as well as doing some takeout food did not feel well throughout the course of the day.  She subsequently had multiple episodes of nonbloody watery diarrhea and diffuse abdominal pain.  After 1's episode she stood up and felt lightheaded and then had a syncopal episode where she fell forward and struck her chin on the ground.  There was a loss of consciousness.  Patient is experiencing left jaw pain shin pain as well as right hand and left knee pain.  There was no chest pain preceding the episode and there is no chest pain at the current time.    Vitals: I have reviewed the patients vital signs  General: nontoxic appearing  HEENT: Laceration to chin, normocephalic, airway patent  Eyes: EOMI, tracking appropriately  Neck: no tracheal deviation  Chest/Lungs: no trauma, symmetric chest rise, speaking in complete sentences,  no resp distress  Heart: skin and extremities well perfused, regular rate and rhythm  Neuro: A+Ox3, appears non focal  MSK: no deformities  Skin: no cyanosis, no jaundice   Psych:  Normal mood and affect    42-year-old female past with depression anxiety pending with facial trauma after syncopal episode.  Syncope seems most likely to be vasovagal or hypovolemic in nature.  This does not appear to be of a cardiac etiology related to a tachyarrhythmia or bradyarrhythmia.  I will hydrate from the standpoint.  EKG shows no signs of arrhythmia.  With some laboratory studies to ensure proper hemoglobin as well as electrolytes.  With the facial pain that she is experiencing I am concerned for possible mandible injury will get CT scan.  We will also get plain films of the knee and hands.  Disposition will depend on results of imaging labs as well as patient's response to therapy

## 2023-06-12 NOTE — ED ADULT NURSE REASSESSMENT NOTE - NS ED NURSE REASSESS COMMENT FT1
Report and pt received, pt returned from dental clinic, A&Ox4, respirations even and unlabored. Pt c/o 10/10 jaw pain and appears uncomfortable. Pt denies any chest pain or sob. IV site patent, no redness or swelling noted.  aware of pain, awaiting medication orders. Bed in [facility], safety maintained.

## 2023-06-12 NOTE — ED PROVIDER NOTE - PROGRESS NOTE DETAILS
Chris Mueller MD (PGY-3): OMFS consulted for left mandibular fracture, said they will get back to us if they will take pt to the OR. Laceration will be repaired in ED. OMFS will take patient to clinic to josh gonzalez.  When she returns, she can be reassessed and discharged on Augmentin liquid for 10 days and pain meds liquid.

## 2023-06-12 NOTE — ED ADULT NURSE NOTE - NSFALLRISKINTERV_ED_ALL_ED

## 2023-06-12 NOTE — ED PROVIDER NOTE - CARE PLAN
Principal Discharge DX:	Mandibular fracture  Secondary Diagnosis:	Laceration of skin of chin  Secondary Diagnosis:	Syncope   1

## 2023-06-12 NOTE — ED PROVIDER NOTE - NSFOLLOWUPINSTRUCTIONS_ED_ALL_ED_FT
You were seen in the ED after a fall.     Your CT showed a left mandibular fracture.  Please follow-up with the oral surgeon in the next few days for this.     Your x-rays and CT scans were otherwise reassuring.    You had a laceration on your chin that required 2 deep sutures and 12 superficial sutures.  The deep sutures are absorbable.  For the superficial sutures, please return to the ED, your primary care doctor, or urgent care in the next 5 to 7 days to have them removed.  In the meantime you are allowed to shower and let soapy water wash over the area but do not scrub.  Do not go into pools or bathtubs in the meantime.  Dab dry after showering.  Keep the area dry and clean.  Return to the ED if you notice any signs of infection including redness around the site, purulent discharge, fevers or chills.    For pain, please take Tylenol and ibuprofen as per the over-the-counter bottles.    Please follow up with your PCP in 2-3 days. Return to the ED if you experience any worsening or new symptoms or any symptoms that concern you, including fevers, chills, shortness of breath, chest pain You were seen in the ED after a fall.     Your CT showed a left mandibular fracture.  Please follow-up with the oral surgeon in the next few days for this.    Your x-rays and CT scans were otherwise reassuring.    You had a laceration on your chin that required 2 deep sutures and 12 superficial sutures.  The deep sutures are absorbable.  For the superficial sutures, please return to the ED, your primary care doctor, or urgent care in the next 5 to 7 days to have them removed.  In the meantime you are allowed to shower and let soapy water wash over the area but do not scrub.  Do not go into pools or bathtubs in the meantime.  Dab dry after showering.  Keep the area dry and clean.  Return to the ED if you notice any signs of infection including redness around the site, purulent discharge, fevers or chills.    For pain, please take Tylenol and ibuprofen as per the over-the-counter bottles.    Please follow up with your PCP in 2-3 days. Return to the ED if you experience any worsening or new symptoms or any symptoms that concern you, including fevers, chills, shortness of breath, chest pain      For pain control take the following medications    Acetaminophen 1000 mg (30mL) every 8 hours for pain  alternate with Motrin 600 mg (30 mL) every 8 hours for pain  For breakthrough pain not controlled by the above medications take liquid morphine 4-6 mL up to every 8 hours.    Followup with the Below office in 1-2 weeks for re-evaluation    [facility]  Oral and Maxillofacial Surgery Clinic   Address: [phone] [place] [address]  Phone: [phone]

## 2023-06-12 NOTE — ED ADULT NURSE REASSESSMENT NOTE - NS ED NURSE REASSESS COMMENT FT1
Pt discharged by provider, ambulatory with steady gait upon leaving. IV removed. Pt well appearing, no acute distress noted upon leaving ED.

## 2023-06-12 NOTE — ED ADULT NURSE REASSESSMENT NOTE - NS ED NURSE REASSESS COMMENT FT1
Report received from night shift RN. Pt appears in no acute distress. Pt denies any complaints at this time. Vitals as noted. will continue to monitor

## 2023-06-12 NOTE — ED PROVIDER NOTE - CLINICAL SUMMARY MEDICAL DECISION MAKING FREE TEXT BOX
Chris Mueller MD (PGY-3): The patient is a 42y Female with pmhx of depression/anxiety p/w syncopal episode and chin laceration. Syncopal episode most likely 2/2 to dehydration from diarrhea and decreased PO intake (vasovagal). Low suspicion for ACS or arrythmia. Will evaluate for anemia and electrolyte derangement. Will evaluate for intracranial bleed, c-spine fracture, and mandibular fracture. Will evaluate for R hand and L knee/tibia/fibula fractures. ekg, x-rays, CT scans, labs, IVF, pain control, tetanus, lac repair, reassess. Dispo pending w/u.

## 2023-06-12 NOTE — ED PROVIDER NOTE - PHYSICAL EXAMINATION
GEN - NAD, well appearing, A&Ox3  HEAD - 3cm horizontal laceration on chin that is about 1cm deep, not actively bleeding. TTP over left mandibular ramus.  EYES - PERRL, EOMI  ENT - Airway patent, mucous membranes dry  NECK - Supple, non-tender without lymphadenopathy, no masses  PULMONARY - CTA b/l, symmetric breath sounds, no W/R/R  CARDIAC - +S1S2, RRR, no M/G/R, no JVD  CHEST WALL - No chest wall or rib tenderness b/l  PELVIS - Non-tender without any deformities  ABDOMEN - ND, NT, soft, no guarding, no rebound, no masses, no rigidity   - No CVA TTP  BACK - No midline tenderness  EXTREMITIES - FROM, symmetric pulses, no edema, 5/5 strength in b/l UE and LE. TTP over R thenar eminence and L tibial plateau.  NEUROLOGIC - Alert, speech clear, no focal deficits, CN II-XII grossly intact, strength and sensation grossly intact, FTN negative b/l  PSYCH - Normal mood/affect, normal insight

## 2023-06-12 NOTE — CONSULT NOTE ADULT - ASSESSMENT
A/P: 42F p/w facial trauma w/ LOC s/p unwitnessed syncopal fall 2/2 diarrhea, found to have non-displaced L. subcondylar fracture on CT imaging    - Will treat mandible fracture with Closed reduction w/ placement of Maxillary and Mandibular Arch bars and under local anesthesia.   - Will bring patient to OMFS Department for procedure.   - Keep NPO + IV fluid hydration  - IV Unasyn 3g,q6h  - Multimodal analgesia  - Case d/w attending on-call    Moo Jo DDS, MD  OMFS Resident PGY-5  [facility] Pager [phone]  [facility] [phone]  Available on [employer]    [facility]  [facility]   Address: [phone] [place] [address]  Phone: [phone]

## 2025-06-18 ENCOUNTER — NON-APPOINTMENT (OUTPATIENT)
Age: 45
End: 2025-06-18